# Patient Record
Sex: FEMALE | Race: WHITE | NOT HISPANIC OR LATINO | URBAN - METROPOLITAN AREA
[De-identification: names, ages, dates, MRNs, and addresses within clinical notes are randomized per-mention and may not be internally consistent; named-entity substitution may affect disease eponyms.]

---

## 2023-02-28 ENCOUNTER — INPATIENT HOSPITAL (OUTPATIENT)
Dept: URBAN - METROPOLITAN AREA HOSPITAL 50 | Facility: HOSPITAL | Age: 88
End: 2023-02-28
Payer: MEDICARE

## 2023-02-28 DIAGNOSIS — K57.30 DIVERTICULOSIS OF LARGE INTESTINE WITHOUT PERFORATION OR ABS: ICD-10-CM

## 2023-02-28 DIAGNOSIS — K59.00 CONSTIPATION, UNSPECIFIED: ICD-10-CM

## 2023-02-28 DIAGNOSIS — R13.10 DYSPHAGIA, UNSPECIFIED: ICD-10-CM

## 2023-02-28 DIAGNOSIS — K31.7 POLYP OF STOMACH AND DUODENUM: ICD-10-CM

## 2023-02-28 DIAGNOSIS — K31.89 OTHER DISEASES OF STOMACH AND DUODENUM: ICD-10-CM

## 2023-02-28 DIAGNOSIS — D62 ACUTE POSTHEMORRHAGIC ANEMIA: ICD-10-CM

## 2023-02-28 DIAGNOSIS — K92.1 MELENA: ICD-10-CM

## 2023-02-28 DIAGNOSIS — K44.9 DIAPHRAGMATIC HERNIA WITHOUT OBSTRUCTION OR GANGRENE: ICD-10-CM

## 2023-02-28 DIAGNOSIS — K31.819 ANGIODYSPLASIA OF STOMACH AND DUODENUM WITHOUT BLEEDING: ICD-10-CM

## 2023-02-28 DIAGNOSIS — K64.8 OTHER HEMORRHOIDS: ICD-10-CM

## 2023-02-28 PROCEDURE — 99222 1ST HOSP IP/OBS MODERATE 55: CPT | Performed by: INTERNAL MEDICINE

## 2023-03-01 PROCEDURE — 43270 EGD LESION ABLATION: CPT | Performed by: INTERNAL MEDICINE

## 2023-03-01 PROCEDURE — 43239 EGD BIOPSY SINGLE/MULTIPLE: CPT | Mod: XS | Performed by: INTERNAL MEDICINE

## 2023-03-02 PROCEDURE — 45378 DIAGNOSTIC COLONOSCOPY: CPT | Performed by: INTERNAL MEDICINE

## 2024-01-28 ENCOUNTER — INPATIENT HOSPITAL (OUTPATIENT)
Dept: URBAN - METROPOLITAN AREA HOSPITAL 50 | Facility: HOSPITAL | Age: 89
End: 2024-01-28
Payer: MEDICARE

## 2024-01-28 DIAGNOSIS — K21.9 GASTRO-ESOPHAGEAL REFLUX DISEASE WITHOUT ESOPHAGITIS: ICD-10-CM

## 2024-01-28 DIAGNOSIS — K44.9 DIAPHRAGMATIC HERNIA WITHOUT OBSTRUCTION OR GANGRENE: ICD-10-CM

## 2024-01-28 DIAGNOSIS — K57.90 DIVERTICULOSIS OF INTESTINE, PART UNSPECIFIED, WITHOUT PERFO: ICD-10-CM

## 2024-01-28 DIAGNOSIS — D62 ACUTE POSTHEMORRHAGIC ANEMIA: ICD-10-CM

## 2024-01-28 PROCEDURE — 99222 1ST HOSP IP/OBS MODERATE 55: CPT | Mod: FS | Performed by: NURSE PRACTITIONER

## 2024-01-29 ENCOUNTER — INPATIENT HOSPITAL (OUTPATIENT)
Dept: URBAN - METROPOLITAN AREA HOSPITAL 50 | Facility: HOSPITAL | Age: 89
End: 2024-01-29
Payer: MEDICARE

## 2024-01-29 DIAGNOSIS — K44.9 DIAPHRAGMATIC HERNIA WITHOUT OBSTRUCTION OR GANGRENE: ICD-10-CM

## 2024-01-29 DIAGNOSIS — K31.811 ANGIODYSPLASIA OF STOMACH AND DUODENUM WITH BLEEDING: ICD-10-CM

## 2024-01-29 DIAGNOSIS — K31.7 POLYP OF STOMACH AND DUODENUM: ICD-10-CM

## 2024-01-29 PROCEDURE — 43255 EGD CONTROL BLEEDING ANY: CPT | Performed by: INTERNAL MEDICINE

## 2024-01-30 ENCOUNTER — INPATIENT HOSPITAL (OUTPATIENT)
Dept: URBAN - METROPOLITAN AREA HOSPITAL 50 | Facility: HOSPITAL | Age: 89
End: 2024-01-30
Payer: MEDICARE

## 2024-01-30 DIAGNOSIS — K25.9 GASTRIC ULCER, UNSPECIFIED AS ACUTE OR CHRONIC, WITHOUT HEMO: ICD-10-CM

## 2024-01-30 DIAGNOSIS — K21.9 GASTRO-ESOPHAGEAL REFLUX DISEASE WITHOUT ESOPHAGITIS: ICD-10-CM

## 2024-01-30 DIAGNOSIS — K31.811 ANGIODYSPLASIA OF STOMACH AND DUODENUM WITH BLEEDING: ICD-10-CM

## 2024-01-30 DIAGNOSIS — K92.2 GASTROINTESTINAL HEMORRHAGE, UNSPECIFIED: ICD-10-CM

## 2024-01-30 DIAGNOSIS — D62 ACUTE POSTHEMORRHAGIC ANEMIA: ICD-10-CM

## 2024-01-30 DIAGNOSIS — K44.9 DIAPHRAGMATIC HERNIA WITHOUT OBSTRUCTION OR GANGRENE: ICD-10-CM

## 2024-01-30 DIAGNOSIS — K57.90 DIVERTICULOSIS OF INTESTINE, PART UNSPECIFIED, WITHOUT PERFO: ICD-10-CM

## 2024-01-30 PROCEDURE — 99233 SBSQ HOSP IP/OBS HIGH 50: CPT

## 2024-01-31 PROCEDURE — 99232 SBSQ HOSP IP/OBS MODERATE 35: CPT

## 2024-02-21 ENCOUNTER — OFFICE (OUTPATIENT)
Dept: URBAN - METROPOLITAN AREA CLINIC 27 | Facility: CLINIC | Age: 89
End: 2024-02-21
Payer: MEDICARE

## 2024-02-21 VITALS
WEIGHT: 145 LBS | DIASTOLIC BLOOD PRESSURE: 87 MMHG | TEMPERATURE: 98.3 F | SYSTOLIC BLOOD PRESSURE: 161 MMHG | HEART RATE: 81 BPM | HEIGHT: 60 IN

## 2024-02-21 DIAGNOSIS — R14.3 FLATULENCE: ICD-10-CM

## 2024-02-21 DIAGNOSIS — K21.9 GASTRO-ESOPHAGEAL REFLUX DISEASE WITHOUT ESOPHAGITIS: ICD-10-CM

## 2024-02-21 DIAGNOSIS — K25.3 ACUTE GASTRIC ULCER WITHOUT HEMORRHAGE OR PERFORATION: ICD-10-CM

## 2024-02-21 DIAGNOSIS — D62 ACUTE POSTHEMORRHAGIC ANEMIA: ICD-10-CM

## 2024-02-21 DIAGNOSIS — K31.811 ANGIODYSPLASIA OF STOMACH AND DUODENUM WITH BLEEDING: ICD-10-CM

## 2024-02-21 PROCEDURE — 99214 OFFICE O/P EST MOD 30 MIN: CPT

## 2024-02-21 RX ORDER — SUCRALFATE 1 G/1
1 TABLET ORAL
Qty: 90 | Refills: 3 | Status: ACTIVE
Start: 2024-02-21

## 2024-05-09 PROBLEM — E87.1 HYPONATREMIA: Status: ACTIVE | Noted: 2024-05-09

## 2024-05-09 PROBLEM — E78.2 MIXED HYPERLIPIDEMIA: Status: ACTIVE | Noted: 2024-05-09

## 2024-05-09 PROBLEM — I63.411 CEREBROVASCULAR ACCIDENT (CVA) DUE TO EMBOLISM OF RIGHT MIDDLE CEREBRAL ARTERY (MULTI): Status: ACTIVE | Noted: 2024-05-09

## 2024-05-09 PROBLEM — M79.7 FIBROMYALGIA: Status: ACTIVE | Noted: 2024-05-09

## 2024-05-09 PROBLEM — M48.062 LUMBAR STENOSIS WITH NEUROGENIC CLAUDICATION: Status: ACTIVE | Noted: 2024-05-09

## 2024-05-09 PROBLEM — F33.9 DEPRESSION, RECURRENT (CMS-HCC): Status: ACTIVE | Noted: 2024-05-09

## 2024-05-09 PROBLEM — M51.36 DEGENERATION OF INTERVERTEBRAL DISC OF LUMBAR REGION: Status: ACTIVE | Noted: 2024-05-09

## 2024-05-09 PROBLEM — K21.9 GASTROESOPHAGEAL REFLUX DISEASE: Status: ACTIVE | Noted: 2024-05-09

## 2024-05-09 PROBLEM — R13.10 DYSPHAGIA: Status: ACTIVE | Noted: 2024-05-09

## 2024-05-09 PROBLEM — Z85.3 HISTORY OF BREAST CANCER: Status: ACTIVE | Noted: 2024-05-09

## 2024-05-09 PROBLEM — I35.1 NONRHEUMATIC AORTIC VALVE INSUFFICIENCY: Status: ACTIVE | Noted: 2024-05-09

## 2024-05-09 PROBLEM — I25.10 2-VESSEL CORONARY ARTERY DISEASE: Status: ACTIVE | Noted: 2024-05-09

## 2024-05-09 PROBLEM — I10 ESSENTIAL HYPERTENSION: Status: ACTIVE | Noted: 2024-05-09

## 2024-05-09 PROBLEM — G47.01 INSOMNIA DUE TO MEDICAL CONDITION: Status: ACTIVE | Noted: 2024-05-09

## 2024-05-09 PROBLEM — M51.369 DEGENERATION OF INTERVERTEBRAL DISC OF LUMBAR REGION: Status: ACTIVE | Noted: 2024-05-09

## 2024-05-09 PROBLEM — I65.29 CAROTID STENOSIS: Status: ACTIVE | Noted: 2024-05-09

## 2024-05-09 PROBLEM — S42.309A HUMERUS FRACTURE: Status: ACTIVE | Noted: 2024-05-09

## 2024-05-09 PROBLEM — M96.1 POSTLAMINECTOMY SYNDROME OF LUMBOSACRAL REGION: Status: ACTIVE | Noted: 2024-05-09

## 2024-05-09 PROBLEM — N64.59 THICKENING OF SKIN OF BREAST: Status: ACTIVE | Noted: 2024-05-09

## 2024-05-09 PROBLEM — R53.83 FATIGUE: Status: ACTIVE | Noted: 2024-05-09

## 2024-05-09 PROBLEM — R60.0 LOWER LEG EDEMA: Status: ACTIVE | Noted: 2024-05-09

## 2024-05-09 PROBLEM — H90.3 SENSORINEURAL HEARING LOSS, BILATERAL: Status: ACTIVE | Noted: 2024-05-09

## 2024-05-24 ENCOUNTER — APPOINTMENT (OUTPATIENT)
Dept: CARDIOLOGY | Facility: CLINIC | Age: 89
End: 2024-05-24
Payer: MEDICARE

## 2024-06-12 ENCOUNTER — OFFICE VISIT (OUTPATIENT)
Dept: CARDIOLOGY | Facility: CLINIC | Age: 89
End: 2024-06-12
Payer: MEDICARE

## 2024-06-12 VITALS
HEIGHT: 55 IN | OXYGEN SATURATION: 90 % | WEIGHT: 141 LBS | DIASTOLIC BLOOD PRESSURE: 93 MMHG | BODY MASS INDEX: 32.63 KG/M2 | HEART RATE: 84 BPM | SYSTOLIC BLOOD PRESSURE: 164 MMHG

## 2024-06-12 DIAGNOSIS — I25.10 2-VESSEL CORONARY ARTERY DISEASE: Primary | ICD-10-CM

## 2024-06-12 DIAGNOSIS — I50.32 CHRONIC HEART FAILURE WITH PRESERVED EJECTION FRACTION (MULTI): ICD-10-CM

## 2024-06-12 DIAGNOSIS — I10 ESSENTIAL HYPERTENSION: ICD-10-CM

## 2024-06-12 PROCEDURE — 3080F DIAST BP >= 90 MM HG: CPT | Performed by: INTERNAL MEDICINE

## 2024-06-12 PROCEDURE — 1036F TOBACCO NON-USER: CPT | Performed by: INTERNAL MEDICINE

## 2024-06-12 PROCEDURE — 1126F AMNT PAIN NOTED NONE PRSNT: CPT | Performed by: INTERNAL MEDICINE

## 2024-06-12 PROCEDURE — 99214 OFFICE O/P EST MOD 30 MIN: CPT | Performed by: INTERNAL MEDICINE

## 2024-06-12 PROCEDURE — 3077F SYST BP >= 140 MM HG: CPT | Performed by: INTERNAL MEDICINE

## 2024-06-12 PROCEDURE — 1157F ADVNC CARE PLAN IN RCRD: CPT | Performed by: INTERNAL MEDICINE

## 2024-06-12 RX ORDER — PANTOPRAZOLE SODIUM 40 MG/1
40 TABLET, DELAYED RELEASE ORAL
COMMUNITY
Start: 2024-04-24

## 2024-06-12 RX ORDER — FUROSEMIDE 40 MG/1
40 TABLET ORAL DAILY
COMMUNITY
Start: 2024-05-03

## 2024-06-12 RX ORDER — FAMOTIDINE 20 MG/1
20 TABLET, FILM COATED ORAL DAILY
COMMUNITY
Start: 2024-04-24

## 2024-06-12 RX ORDER — TAMSULOSIN HYDROCHLORIDE 0.4 MG/1
0.4 CAPSULE ORAL DAILY
COMMUNITY
Start: 2024-04-24

## 2024-06-12 RX ORDER — MELOXICAM 7.5 MG/1
7.5 TABLET ORAL DAILY
COMMUNITY
Start: 2024-01-25

## 2024-06-12 RX ORDER — GABAPENTIN 100 MG/1
100 CAPSULE ORAL
COMMUNITY
Start: 2024-04-24

## 2024-06-12 RX ORDER — AMLODIPINE BESYLATE 5 MG/1
5 TABLET ORAL DAILY
COMMUNITY
Start: 2024-04-24

## 2024-06-12 RX ORDER — METOPROLOL SUCCINATE 50 MG/1
50 TABLET, EXTENDED RELEASE ORAL DAILY
COMMUNITY
Start: 2024-04-24

## 2024-06-12 RX ORDER — RAMIPRIL 10 MG/1
1 CAPSULE ORAL 2 TIMES DAILY
COMMUNITY
Start: 2014-12-31

## 2024-06-12 RX ORDER — MECLIZINE HCL 12.5 MG 12.5 MG/1
12.5 TABLET ORAL 3 TIMES DAILY PRN
COMMUNITY
Start: 2024-04-24

## 2024-06-12 RX ORDER — MIRABEGRON 50 MG/1
50 TABLET, FILM COATED, EXTENDED RELEASE ORAL DAILY
COMMUNITY
Start: 2024-04-25

## 2024-06-12 RX ORDER — SUCRALFATE 1 G/1
1 TABLET ORAL
COMMUNITY
Start: 2024-04-24

## 2024-06-12 RX ORDER — TRAMADOL HYDROCHLORIDE 50 MG/1
50 TABLET ORAL
COMMUNITY
Start: 2024-05-29

## 2024-06-12 RX ORDER — ATORVASTATIN CALCIUM 40 MG/1
40 TABLET, FILM COATED ORAL DAILY
COMMUNITY
Start: 2024-04-24

## 2024-06-12 RX ORDER — GABAPENTIN 300 MG/1
300 CAPSULE ORAL
COMMUNITY
Start: 2024-01-25

## 2024-06-12 RX ORDER — OXYBUTYNIN CHLORIDE 5 MG/1
5 TABLET, EXTENDED RELEASE ORAL DAILY
COMMUNITY
Start: 2024-04-24

## 2024-06-12 RX ORDER — EPINEPHRINE 0.22MG
1 AEROSOL WITH ADAPTER (ML) INHALATION 2 TIMES DAILY
COMMUNITY
Start: 2013-05-24

## 2024-06-12 RX ORDER — LOSARTAN POTASSIUM 25 MG/1
25 TABLET ORAL DAILY
COMMUNITY
Start: 2024-04-24

## 2024-06-12 RX ORDER — FUROSEMIDE 20 MG/1
20 TABLET ORAL DAILY
COMMUNITY
Start: 2024-04-24

## 2024-06-12 ASSESSMENT — PAIN SCALES - GENERAL: PAINLEVEL: 0-NO PAIN

## 2024-06-12 NOTE — PATIENT INSTRUCTIONS
Follow up with Dr. Ramicone in 6-8 months.    Follow up with Leonie Rodriguez in 1-2 months at [facility].

## 2024-06-12 NOTE — PROGRESS NOTES
"History Of Present Illness:      This is a 89-year-old female with coronary artery disease and hypertension.  She resides in an assisted living facility.  The patient has had an increase in lower extremity edema and was placed on furosemide, initially 40 mg daily for 5 days, then down to 20 mg daily.  She is having some dyspnea on exertion.  Her daughters were present during today's evaluation.  The patient was evaluated for pneumonia but this was ruled out.    Review of Systems  Other review of systems negative     Last Recorded Vitals:      8/8/2022     8:11 AM 8/8/2022    11:35 AM 8/10/2022    10:33 AM 8/17/2022    11:52 AM 3/15/2023     1:10 PM 8/25/2023    11:18 AM 6/12/2024     2:35 PM   Vitals   Systolic 188 161 146 188 147 151 164   Diastolic 98 82 70 95 79 90 93   Heart Rate 73 68 79 72 77 70 84   Temp  36.3 °C (97.3 °F)  36.8 °C (98.2 °F)      Resp 20 18  20      Height (in) 1.372 m (4' 6\") 1.391 m (4' 6.76\") 1.372 m (4' 6\") 1.372 m (4' 6\") 1.372 m (4' 6\") 1.372 m (4' 6\") 1.372 m (4' 6\")   Weight (lb) 128 123.68 124 124  137.6 141   BMI 30.86 kg/m2 28.99 kg/m2 29.9 kg/m2 29.9 kg/m2 29.9 kg/m2 33.18 kg/m2 34 kg/m2   BSA (m2) 1.49 m2 1.47 m2 1.46 m2 1.46 m2 1.46 m2 1.54 m2 1.56 m2   Visit Report       Report          Allergies:  Propoxyphene n-acetaminophen and Atorvastatin    Outpatient Medications:  Current Outpatient Medications   Medication Instructions    amLODIPine (NORVASC) 5 mg, oral, Daily    aspirin-calcium carbonate 81 mg-300 mg calcium(777 mg) tablet 1 tablet, oral, Daily    atorvastatin (LIPITOR) 40 mg, oral, Daily    coenzyme Q-10 100 mg capsule 1 capsule, oral, 2 times daily    famotidine (PEPCID) 20 mg, oral, Daily    furosemide (LASIX) 20 mg, oral, Daily    furosemide (LASIX) 40 mg, oral, Daily    gabapentin (NEURONTIN) 100 mg, oral    gabapentin (NEURONTIN) 300 mg, oral    losartan (COZAAR) 25 mg, oral, Daily    meclizine (ANTIVERT) 12.5 mg, oral, 3 times daily PRN    meloxicam (MOBIC) 7.5 " mg, oral, Daily    metoprolol succinate XL (TOPROL-XL) 50 mg, oral, Daily    Myrbetriq 50 mg, oral, Daily    oxybutynin XL (DITROPAN-XL) 5 mg, oral, Daily    pantoprazole (PROTONIX) 40 mg, oral, Daily before breakfast    ramipril (Altace) 10 mg capsule 1 capsule, oral, 2 times daily    sucralfate (CARAFATE) 1 g, oral    tamsulosin (FLOMAX) 0.4 mg, oral, Daily    traMADol (ULTRAM) 50 mg       Physical Exam:    General Appearance:  Alert, oriented, no distress  Skin:  Warm and dry  Head and Neck:  No elevation of JVP, no carotid bruits  Cardiac Exam:  Rhythm is regular, S1 and S2 are normal, no murmur S3 or S4  Lungs:  Clear to auscultation  Extremities:  1-2+ edema  Neurologic:  No focal deficits  Psychiatric:  Appropriate mood and behavior         Cardiology Tests:  I have personally review the diagnostic cardiac testing and my interpretation is as follows:    Echocardiogram April 2023: Normal left ventricular function with mild to moderate mitral valve regurgitation  Myocardial perfusion stress test November 2021: No ischemia, normal left ventricular function      Assessment/Plan   Problem List Items Addressed This Visit             ICD-10-CM    2-vessel coronary artery disease - Primary I25.10    Relevant Medications    amLODIPine (Norvasc) 5 mg tablet    metoprolol succinate XL (Toprol-XL) 50 mg 24 hr tablet    Essential hypertension I10    Chronic heart failure with preserved ejection fraction (Multi) I50.32     1.  HFpEF: The patient has evidence of diastolic congestive heart failure.  I recommended that the furosemide be increased to 40 mg daily.  Her weight should be monitored on a daily basis at the assisted living facility and then if her weight drops by 5 to 10 pounds, the furosemide can be decreased back to 20 mg daily and adjusted upward as needed for weight gain of 2 to 3 pounds.  If she continues to have difficulties, we could consider the addition of an SGLT2 inhibitor.    2.  Coronary artery disease:  History of CAD with CABG surgery in 2007.  The most recent myocardial perfusion imaging from November 2021 showed no ischemia and normal left ventricular function.  I would not recommend further ischemic evaluations given her advanced age and frail condition.  Medical management is recommended.         Relevant Medications    amLODIPine (Norvasc) 5 mg tablet    metoprolol succinate XL (Toprol-XL) 50 mg 24 hr tablet       James C Ramicone, DO

## 2024-06-12 NOTE — ASSESSMENT & PLAN NOTE
1.  HFpEF: The patient has evidence of diastolic congestive heart failure.  I recommended that the furosemide be increased to 40 mg daily.  Her weight should be monitored on a daily basis at the assisted living facility and then if her weight drops by 5 to 10 pounds, the furosemide can be decreased back to 20 mg daily and adjusted upward as needed for weight gain of 2 to 3 pounds.  If she continues to have difficulties, we could consider the addition of an SGLT2 inhibitor.    2.  Coronary artery disease: History of CAD with CABG surgery in 2007.  The most recent myocardial perfusion imaging from November 2021 showed no ischemia and normal left ventricular function.  I would not recommend further ischemic evaluations given her advanced age and frail condition.  Medical management is recommended.

## 2024-06-27 PROBLEM — M19.90 ARTHRITIS: Status: ACTIVE | Noted: 2024-06-27

## 2024-06-27 PROBLEM — D50.9 IRON DEFICIENCY ANEMIA: Status: ACTIVE | Noted: 2024-06-27

## 2024-06-27 PROBLEM — Z96.611 S/P REVERSE TOTAL SHOULDER ARTHROPLASTY, RIGHT: Status: ACTIVE | Noted: 2021-11-29

## 2024-06-27 PROBLEM — I10 HYPERTENSION: Status: ACTIVE | Noted: 2021-04-14

## 2024-06-27 PROBLEM — I25.10 CORONARY ATHEROSCLEROSIS: Status: ACTIVE | Noted: 2021-04-14

## 2024-06-27 PROBLEM — Q24.5 MALFORMATION OF CORONARY VESSELS (HHS-HCC): Status: ACTIVE | Noted: 2024-06-27

## 2024-06-27 PROBLEM — C50.919 MALIGNANT NEOPLASM OF UNSPECIFIED SITE OF UNSPECIFIED FEMALE BREAST (MULTI): Status: ACTIVE | Noted: 2024-06-27

## 2024-06-27 PROBLEM — L94.0: Status: ACTIVE | Noted: 2024-06-27

## 2024-07-18 ENCOUNTER — OFFICE VISIT (OUTPATIENT)
Dept: CARDIOLOGY | Facility: CLINIC | Age: 89
End: 2024-07-18
Payer: MEDICARE

## 2024-07-18 VITALS
SYSTOLIC BLOOD PRESSURE: 121 MMHG | BODY MASS INDEX: 32.63 KG/M2 | HEART RATE: 73 BPM | WEIGHT: 141 LBS | DIASTOLIC BLOOD PRESSURE: 72 MMHG | OXYGEN SATURATION: 91 % | HEIGHT: 55 IN

## 2024-07-18 DIAGNOSIS — I50.32 CHRONIC HEART FAILURE WITH PRESERVED EJECTION FRACTION (MULTI): Primary | ICD-10-CM

## 2024-07-18 DIAGNOSIS — R60.0 LOWER EXTREMITY EDEMA: ICD-10-CM

## 2024-07-18 PROCEDURE — 99213 OFFICE O/P EST LOW 20 MIN: CPT | Performed by: NURSE PRACTITIONER

## 2024-07-18 PROCEDURE — 3078F DIAST BP <80 MM HG: CPT | Performed by: NURSE PRACTITIONER

## 2024-07-18 PROCEDURE — 1157F ADVNC CARE PLAN IN RCRD: CPT | Performed by: NURSE PRACTITIONER

## 2024-07-18 PROCEDURE — 1159F MED LIST DOCD IN RCRD: CPT | Performed by: NURSE PRACTITIONER

## 2024-07-18 PROCEDURE — 3074F SYST BP LT 130 MM HG: CPT | Performed by: NURSE PRACTITIONER

## 2024-07-18 PROCEDURE — 1036F TOBACCO NON-USER: CPT | Performed by: NURSE PRACTITIONER

## 2024-07-18 NOTE — PROGRESS NOTES
Anne Alberts is a 89 y.o. female     History Of Present Illness   Ms Alberts is a 89 year old here for complaints of lower extremity edema.  She reports that since she increased her lasix to 40mg, her edema is almost completely resolved, however her weight is unchanged.  She is also having episodes of desaturation when she is ambulating in physical therapy.  She admits she is not as active as she use to be and often sits in a recliner.  She now resides in long term care facility.    PMH  Acute blood loss anemia   GI bleed   Metabolic encephalopathy   Community acquired pneumonia   Hypertension,    hyperlipidemia,    Coronary artery disease,   GERD,    breast cancer s/p lumpectomy,    Spinal stenosis,    Recent SDH    Hyponatremia       Social HX  Social History     Tobacco Use    Smoking status: Never    Smokeless tobacco: Never          Family HX  No family history on file.       Review Of Systems   Mild ankle edema  Shortness of breath and desat during exertion  No weight gain or weight loss  Denies orthopnea  Denies chest pain  Denies leg pain      Allergies  Allergies   Allergen Reactions    Propoxyphene N-Acetaminophen Other    Atorvastatin Unknown          Vitals  There were no vitals taken for this visit.        Physical Exam  +triphasic bilateral DP and PT signals  Mild lower extremity edema  Pulse ox 88-92% in the office      Current/Home Meds    Current Outpatient Medications:     amLODIPine (Norvasc) 5 mg tablet, Take 1 tablet (5 mg) by mouth once daily., Disp: , Rfl:     aspirin-calcium carbonate 81 mg-300 mg calcium(777 mg) tablet, Take 1 tablet by mouth once daily., Disp: , Rfl:     atorvastatin (Lipitor) 40 mg tablet, Take 1 tablet (40 mg) by mouth once daily., Disp: , Rfl:     coenzyme Q-10 100 mg capsule, Take 1 capsule (100 mg) by mouth 2 times a day., Disp: , Rfl:     famotidine (Pepcid) 20 mg tablet, Take 1 tablet (20 mg) by mouth once daily., Disp: , Rfl:     furosemide (Lasix) 20 mg  tablet, Take 1 tablet (20 mg) by mouth once daily., Disp: , Rfl:     furosemide (Lasix) 40 mg tablet, Take 1 tablet (40 mg) by mouth once daily., Disp: , Rfl:     gabapentin (Neurontin) 100 mg capsule, Take 1 capsule (100 mg) by mouth., Disp: , Rfl:     gabapentin (Neurontin) 300 mg capsule, Take 1 capsule (300 mg) by mouth., Disp: , Rfl:     losartan (Cozaar) 25 mg tablet, Take 1 tablet (25 mg) by mouth once daily., Disp: , Rfl:     meclizine (Antivert) 12.5 mg tablet, Take 1 tablet (12.5 mg) by mouth 3 times a day as needed., Disp: , Rfl:     meloxicam (Mobic) 7.5 mg tablet, Take 1 tablet (7.5 mg) by mouth once daily., Disp: , Rfl:     metoprolol succinate XL (Toprol-XL) 50 mg 24 hr tablet, Take 1 tablet (50 mg) by mouth once daily., Disp: , Rfl:     Myrbetriq 50 mg tablet extended release 24 hr 24 hr tablet, Take 1 tablet (50 mg) by mouth once daily., Disp: , Rfl:     oxybutynin XL (Ditropan-XL) 5 mg 24 hr tablet, Take 1 tablet (5 mg) by mouth once daily., Disp: , Rfl:     pantoprazole (ProtoNix) 40 mg EC tablet, Take 1 tablet (40 mg) by mouth once daily in the morning. Take before meals., Disp: , Rfl:     ramipril (Altace) 10 mg capsule, Take 1 capsule (10 mg) by mouth 2 times a day., Disp: , Rfl:     sucralfate (Carafate) 1 gram tablet, Take 1 tablet (1 g) by mouth., Disp: , Rfl:     tamsulosin (Flomax) 0.4 mg 24 hr capsule, Take 1 capsule (0.4 mg) by mouth once daily., Disp: , Rfl:     traMADol (Ultram) 50 mg tablet, 1 tablet (50 mg)., Disp: , Rfl:            Cardiac Service Results:  No results found for this or any previous visit from the past 365 days.        Assessment/Plan      Lower extremity edema:  Much improved with lasix 40mg.  She is to continue lasix 40mg until her edema is resolved.  Her weight has not changed.  She is to wear compression stockings from am to HS.  She is to elevate her legs above her heart 2-3 times a day above her heart  I have instructed her on chair exercises that I would like  her to do three times a day.  Patient should be evaluated for O2 while exerting herself.  Follow up with Dr Ramicone as directed

## 2024-07-19 NOTE — PROGRESS NOTES
Order addended, nursing facility that pt resides at needs order to be more specific. Discussed with Elidia

## 2024-08-16 ENCOUNTER — APPOINTMENT (OUTPATIENT)
Dept: CARDIOLOGY | Facility: CLINIC | Age: 89
End: 2024-08-16

## 2024-09-20 ENCOUNTER — TELEPHONE (OUTPATIENT)
Dept: CARDIOLOGY | Facility: CLINIC | Age: 89
End: 2024-09-20
Payer: MEDICARE

## 2024-09-24 NOTE — TELEPHONE ENCOUNTER
Pt is scheduled for cysto, pyelogram, bladder bx with fulguration at [facility] on 9/27/24.    Clearance is requested.     Discussed with Dr Ramicone, notified him that she was recently hospitalized at [facility]--had LHC that did not require intervention and echo. He will clear pt for surgery. I notified dtr. I faxed request.

## 2024-11-01 ENCOUNTER — TELEPHONE (OUTPATIENT)
Dept: CARDIOLOGY | Facility: CLINIC | Age: 89
End: 2024-11-01
Payer: COMMERCIAL

## 2024-11-01 NOTE — TELEPHONE ENCOUNTER
Daughter states she was recently at [facility] and taken off her meds and she has been acting funny Ramicone has nothing sooner  Avis first available is not until next month    Last saw DD 7-18  Zhang saw JCR 6-12

## 2024-11-06 ENCOUNTER — OFFICE VISIT (OUTPATIENT)
Dept: CARDIOLOGY | Facility: CLINIC | Age: 89
End: 2024-11-06
Payer: COMMERCIAL

## 2024-11-06 VITALS
OXYGEN SATURATION: 94 % | BODY MASS INDEX: 30.72 KG/M2 | SYSTOLIC BLOOD PRESSURE: 128 MMHG | DIASTOLIC BLOOD PRESSURE: 88 MMHG | HEART RATE: 79 BPM | WEIGHT: 127.4 LBS

## 2024-11-06 DIAGNOSIS — I50.32 CHRONIC HEART FAILURE WITH PRESERVED EJECTION FRACTION: Primary | ICD-10-CM

## 2024-11-06 PROBLEM — M54.50 LOW BACK PAIN: Status: ACTIVE | Noted: 2024-11-06

## 2024-11-06 PROBLEM — R39.9 SYMPTOMS INVOLVING URINARY SYSTEM: Status: ACTIVE | Noted: 2024-11-06

## 2024-11-06 PROCEDURE — 3079F DIAST BP 80-89 MM HG: CPT | Performed by: PHYSICIAN ASSISTANT

## 2024-11-06 PROCEDURE — 99214 OFFICE O/P EST MOD 30 MIN: CPT | Performed by: PHYSICIAN ASSISTANT

## 2024-11-06 PROCEDURE — 1160F RVW MEDS BY RX/DR IN RCRD: CPT | Performed by: PHYSICIAN ASSISTANT

## 2024-11-06 PROCEDURE — 1159F MED LIST DOCD IN RCRD: CPT | Performed by: PHYSICIAN ASSISTANT

## 2024-11-06 PROCEDURE — 1157F ADVNC CARE PLAN IN RCRD: CPT | Performed by: PHYSICIAN ASSISTANT

## 2024-11-06 PROCEDURE — 3074F SYST BP LT 130 MM HG: CPT | Performed by: PHYSICIAN ASSISTANT

## 2024-11-06 NOTE — PROGRESS NOTES
Chief Complaint:   New Patient Visit (Hospital follow up ), HFpEF, recent med changes     History Of Present Illness:    Anne Alberts is a 89 y.o. female presenting after recent hospitalization due to concerns for underlying ischemic CAD.  Acute ischemic workup at [facility] was essentially unremarkable, during admission ARB therapy and furosemide were DC'D for unknown reasons.  Since that time patient has been more dyspneic with malaise/fatigue - recent urine culture was positive for likely UTI.  Patient denies chest pain, chest pressure, palpitations, shortness of breath at rest, diaphoresis, nausea/vomiting, back pain, headache, lightheadedness, dizziness, syncope or presyncopal episodes, active bleeding signs or symptoms, excessive weight gain, muscle or joint pain, claudication.       Last Recorded Vitals:  Vitals:    11/06/24 1405   BP: 128/88   BP Location: Left arm   Patient Position: Sitting   BP Cuff Size: Adult   Pulse: 79   SpO2: 94%   Weight: 57.8 kg (127 lb 6.4 oz)       Past Medical History:  She has a past medical history of Epistaxis (05/04/2016), Gastro-esophageal reflux disease without esophagitis (09/17/2020), Laceration without foreign body of left forearm, initial encounter (06/11/2015), Other conditions influencing health status, Other intervertebral disc degeneration, lumbar region (07/13/2015), Pelvic and perineal pain (10/12/2015), Personal history of diseases of the blood and blood-forming organs and certain disorders involving the immune mechanism, Personal history of other diseases of the circulatory system (09/15/2015), Personal history of other diseases of the digestive system (09/15/2015), Personal history of other endocrine, nutritional and metabolic disease (09/15/2015), Personal history of other specified conditions (05/03/2016), Personal history of transient ischemic attack (TIA), and cerebral infarction without residual deficits, Pure hypercholesterolemia, unspecified, and  Thyrotoxicosis, unspecified without thyrotoxic crisis or storm (04/14/2015).    Past Surgical History:  She has a past surgical history that includes Other surgical history (05/24/2013); Knee arthroscopy w/ debridement (05/24/2013); Other surgical history (05/24/2013); Cervical fusion (05/24/2013); Coronary artery bypass graft (05/24/2013); Appendectomy (05/24/2013); Back surgery (07/13/2015); Total shoulder arthroplasty (12/28/2016); Other surgical history (06/11/2021); Tonsillectomy (05/04/2016); Other surgical history (02/07/2014); Shoulder surgery (01/20/2014); Total knee arthroplasty (01/20/2014); and Colonoscopy (06/03/2014).      Social History:  She reports that she has never smoked. She has never used smokeless tobacco. No history on file for alcohol use and drug use.    Family History:  No family history on file.     Allergies:  Propoxyphene n-acetaminophen and Atorvastatin    Outpatient Medications:  Current Outpatient Medications   Medication Instructions    amLODIPine (NORVASC) 5 mg, Daily    aspirin-calcium carbonate 81 mg-300 mg calcium(777 mg) tablet 1 tablet, Daily    atorvastatin (LIPITOR) 40 mg, Daily    coenzyme Q-10 100 mg capsule 1 capsule, 2 times daily    famotidine (PEPCID) 20 mg, Daily    gabapentin (NEURONTIN) 300 mg    meclizine (ANTIVERT) 12.5 mg, 3 times daily PRN    meloxicam (MOBIC) 7.5 mg, Daily    metoprolol succinate XL (TOPROL-XL) 50 mg, Daily    Myrbetriq 50 mg, Daily    oxybutynin XL (DITROPAN-XL) 5 mg, Daily    pantoprazole (PROTONIX) 40 mg, Daily before breakfast    ramipril (Altace) 10 mg capsule 1 capsule, 2 times daily    sucralfate (CARAFATE) 1 g    tamsulosin (FLOMAX) 0.4 mg, Daily    traMADol (ULTRAM) 50 mg       Physical Exam:  Constitutional: awake and alert, oriented ×3, no apparent distress  Skin: warm, dry, good turgor no obvious lesions  Eyes: pupils equal, round, reactive to light, conjunctiva pink and noninjected, no discharge  HENT: normocephalic and atraumatic,  "mucous membranes moist, trachea midline with no masses/goiter  Cardiovascular: S1/S2 regular, no murmur no rubs/gallops, no carotid bruits, no JVD  Pulmonary:  +crackles at bilateral lung bases  Abdomen: nontender, nondistended, active bowel sounds, no ascites  Extremities: no cyanosis, clubbing, no LE edema no lesions; palpable pedal pulses  Neurologic: cranial nerves II - XII grossly intact, stable gait, no tremor       Last Labs:  CBC -  Lab Results   Component Value Date    WBC 7.1 08/20/2021    HGB 13.2 08/20/2021    HCT 40.9 08/20/2021     08/20/2021     08/20/2021       CMP -  Lab Results   Component Value Date    CALCIUM 9.9 08/20/2021    PROT 7.3 08/20/2021    ALBUMIN 4.5 08/20/2021    AST 25 08/20/2021    ALT 16 08/20/2021    ALKPHOS 76 08/20/2021    BILITOT 0.6 08/20/2021       LIPID PANEL -   Lab Results   Component Value Date    CHOL 139 08/20/2021    TRIG 209 (H) 08/20/2021    HDL 39.8 (A) 08/20/2021    CHHDL 3.5 08/20/2021    LDLF 57 08/20/2021    VLDL 42 (H) 08/20/2021    NHDL 99 08/20/2021       RENAL FUNCTION PANEL -   Lab Results   Component Value Date    GLUCOSE 103 (H) 08/20/2021     (L) 08/20/2021    K 4.6 08/20/2021    CL 96 (L) 08/20/2021    CO2 27 08/20/2021    ANIONGAP 15 08/20/2021    BUN 18 08/20/2021    CREATININE 0.89 08/20/2021    CALCIUM 9.9 08/20/2021    ALBUMIN 4.5 08/20/2021        No results found for: \"BNP\", \"HGBA1C\"    Last Cardiology Tests:  ECG:  No results found for this or any previous visit from the past 1095 days.      Echo:  No results found for this or any previous visit from the past 1095 days.      Ejection Fractions:  No results found for: \"EF\"    Cath:  No results found for this or any previous visit from the past 1095 days.      Stress Test:  No results found for this or any previous visit from the past 1095 days.      Cardiac Imaging:  No results found for this or any previous visit from the past 1095 days.      Assessment/Plan   Problem List " Items Addressed This Visit             ICD-10-CM       Cardiac and Vasculature    Chronic heart failure with preserved ejection fraction - Primary I50.32    Relevant Orders    Transthoracic echo (TTE) complete       -We will arrange for a 2D echocardiogram to assess LVEF and valvular function.    -Resume furosemide 20mg daily    -Continue all other current GDMT as prescribed    -F/U Dr. Ramicone in 2 months as previously scheduled      RADHA MansfieldC

## 2024-11-14 ENCOUNTER — APPOINTMENT (OUTPATIENT)
Dept: CARDIOLOGY | Facility: CLINIC | Age: 89
End: 2024-11-14
Payer: COMMERCIAL

## 2024-11-20 ENCOUNTER — HOSPITAL ENCOUNTER (OUTPATIENT)
Dept: CARDIOLOGY | Facility: CLINIC | Age: 89
Discharge: HOME | End: 2024-11-20
Payer: COMMERCIAL

## 2024-11-20 DIAGNOSIS — I50.32 CHRONIC HEART FAILURE WITH PRESERVED EJECTION FRACTION: ICD-10-CM

## 2024-11-20 PROCEDURE — 93306 TTE W/DOPPLER COMPLETE: CPT | Performed by: INTERNAL MEDICINE

## 2024-11-20 PROCEDURE — 76376 3D RENDER W/INTRP POSTPROCES: CPT

## 2024-11-22 LAB
AORTIC VALVE MEAN GRADIENT: 3 MMHG
AORTIC VALVE PEAK VELOCITY: 1.26 M/S
AV PEAK GRADIENT: 6 MMHG
EJECTION FRACTION APICAL 4 CHAMBER: 67.9
EJECTION FRACTION: 63 %
LEFT VENTRICLE INTERNAL DIMENSION DIASTOLE: 4.57 CM (ref 3.5–6)
MITRAL VALVE E/A RATIO: 0.72
RIGHT VENTRICLE FREE WALL PEAK S': 8 CM/S
RIGHT VENTRICLE PEAK SYSTOLIC PRESSURE: 30.9 MMHG
TRICUSPID ANNULAR PLANE SYSTOLIC EXCURSION: 1.8 CM

## 2024-12-23 PROBLEM — H35.3110 NONEXUDATIVE AGE-RELATED MACULAR DEGENERATION OF RIGHT EYE: Status: ACTIVE | Noted: 2024-11-18

## 2025-01-08 ENCOUNTER — OFFICE VISIT (OUTPATIENT)
Dept: CARDIOLOGY | Facility: CLINIC | Age: OVER 89
End: 2025-01-08
Payer: COMMERCIAL

## 2025-01-08 VITALS
HEIGHT: 55 IN | HEART RATE: 90 BPM | DIASTOLIC BLOOD PRESSURE: 98 MMHG | BODY MASS INDEX: 31.7 KG/M2 | SYSTOLIC BLOOD PRESSURE: 155 MMHG | WEIGHT: 137 LBS

## 2025-01-08 DIAGNOSIS — I50.32 CHRONIC HEART FAILURE WITH PRESERVED EJECTION FRACTION: Primary | ICD-10-CM

## 2025-01-08 PROCEDURE — 1159F MED LIST DOCD IN RCRD: CPT | Performed by: INTERNAL MEDICINE

## 2025-01-08 PROCEDURE — 1157F ADVNC CARE PLAN IN RCRD: CPT | Performed by: INTERNAL MEDICINE

## 2025-01-08 PROCEDURE — 99213 OFFICE O/P EST LOW 20 MIN: CPT | Performed by: INTERNAL MEDICINE

## 2025-01-08 PROCEDURE — 3080F DIAST BP >= 90 MM HG: CPT | Performed by: INTERNAL MEDICINE

## 2025-01-08 PROCEDURE — 1036F TOBACCO NON-USER: CPT | Performed by: INTERNAL MEDICINE

## 2025-01-08 PROCEDURE — 3077F SYST BP >= 140 MM HG: CPT | Performed by: INTERNAL MEDICINE

## 2025-01-08 RX ORDER — FUROSEMIDE 20 MG/1
20 TABLET ORAL DAILY
COMMUNITY
Start: 2024-11-20 | End: 2025-01-08 | Stop reason: SDUPTHER

## 2025-01-08 RX ORDER — GRANULES FOR ORAL 3 G/1
3 POWDER ORAL
COMMUNITY
Start: 2024-11-22

## 2025-01-08 RX ORDER — FUROSEMIDE 20 MG/1
20 TABLET ORAL DAILY
Qty: 90 TABLET | Refills: 3 | Status: SHIPPED | OUTPATIENT
Start: 2025-01-08 | End: 2026-01-08

## 2025-01-08 NOTE — PROGRESS NOTES
"CARDIAC ELECTROPHYSIOLOGY PROGRESS NOTE    History Of Present Illness:      This is a 89-year-old female with coronary artery disease and hypertension. She resides in an assisted living facility. The patient was taken to [facility], then recently life flighted to [facility] because of concerns over a STEMI.  She did undergo a cardiac catheterization and had no obstructive CAD, and bypass grafts were patent.  She was treated for diastolic CHF.  [facility] records reviewed.    Patient has a history of CAD with prior CABG surgery in 2007  HFpEF  Hypertension  Hyperlipidemia  History of GI bleeding secondary to AVMs  History of UTIs    Review of Systems  Other review of systems negative     Last Recorded Vitals:      8/10/2022    10:33 AM 8/17/2022    11:52 AM 3/15/2023     1:10 PM 8/25/2023    11:18 AM 6/12/2024     2:35 PM 7/18/2024     9:54 AM 11/6/2024     2:05 PM   Vitals   Systolic 146 188 147 151 164 121 128   Diastolic 70 95 79 90 93 72 88   BP Location     Left arm Left arm Left arm   Heart Rate 79 72 77 70 84 73 79   Temp  36.8 °C (98.2 °F)        Resp  20        Height 1.372 m (4' 6\") 1.372 m (4' 6\") 1.372 m (4' 6\") 1.372 m (4' 6\") 1.372 m (4' 6\") 1.372 m (4' 6\")    Weight (lb) 124 124  137.6 141 141 127.4   BMI 29.9 kg/m2 29.9 kg/m2 29.9 kg/m2 33.18 kg/m2 34 kg/m2 34 kg/m2 30.72 kg/m2   BSA (m2) 1.46 m2 1.46 m2 1.46 m2 1.54 m2 1.56 m2 1.56 m2 1.48 m2   Visit Report     Report Report Report     Allergies:  Propoxyphene n-acetaminophen and Atorvastatin    Outpatient Medications:  Current Outpatient Medications   Medication Instructions    amLODIPine (NORVASC) 5 mg, Daily    aspirin-calcium carbonate 81 mg-300 mg calcium(777 mg) tablet 1 tablet, Daily    atorvastatin (LIPITOR) 40 mg, Daily    coenzyme Q-10 100 mg capsule 1 capsule, 2 times daily    famotidine (PEPCID) 20 mg, Daily    gabapentin (NEURONTIN) 300 mg    meclizine (ANTIVERT) 12.5 mg, 3 times daily PRN    meloxicam (MOBIC) 7.5 mg, Daily    " "metoprolol succinate XL (TOPROL-XL) 50 mg, Daily    Myrbetriq 50 mg, Daily    oxybutynin XL (DITROPAN-XL) 5 mg, Daily    pantoprazole (PROTONIX) 40 mg, Daily before breakfast    ramipril (Altace) 10 mg capsule 1 capsule, 2 times daily    sucralfate (CARAFATE) 1 g    tamsulosin (FLOMAX) 0.4 mg, Daily    traMADol (ULTRAM) 50 mg       Physical Exam:    General Appearance:  Alert, oriented, no distress  Skin:  Warm and dry  Head and Neck:  No elevation of JVP, no carotid bruits  Cardiac Exam:  Rhythm is regular, S1 and S2 are normal, no murmur S3 or S4  Lungs: Basilar rales  Extremities:  no edema  Neurologic:  No focal deficits  Psychiatric:  Appropriate mood and behavior    Lab Results:    CMP:  Recent Labs     08/20/21  0933 02/12/21  0941 06/03/20  0921 03/21/19  1005 09/13/18  0939   * 139 139 139 135*   K 4.6 4.7 4.5 4.4 4.4   CL 96* 101 100 102 101   CO2 27 30 29 27 27   ANIONGAP 15 13 15 14 11   BUN 18 21 17 19 16   CREATININE 0.89 0.91 0.85 0.88 0.83     Recent Labs     08/20/21 0933 02/12/21  0941 06/03/20  0921 03/21/19  1005 09/13/18  0939   ALBUMIN 4.5  --  4.3  --  4.4   ALKPHOS 76  --  65  --  78   ALT 16 13 15 16 15   AST 25  --  26  --  26   BILITOT 0.6  --  0.7  --  0.7     CBC:  Recent Labs     08/20/21  0933 02/12/21  0941 06/03/20  0921 09/13/18  0939   WBC 7.1 5.3 5.4 5.3   HGB 13.2 12.6 12.6 12.8   HCT 40.9 40.0 39.3 38.5    259 241 262    99 98 98     COAG: No results for input(s): \"PTT\", \"INR\", \"HAUF\", \"DDIMERVTE\", \"HAPTOGLOBIN\", \"FIBRINOGEN\" in the last 39021 hours.  Cardiology Tests:  I have personally review the diagnostic cardiac testing and my interpretation is as follows:    Echocardiogram April 2023: Normal left ventricular function with mild to moderate mitral valve regurgitation.    Myocardial perfusion stress test November 2021: No ischemia, normal left ventricular function.    Cardiac catheterization August 27, 2024: LIMA to LAD patent SVG to diagonal patent SVG " to first diagonal patent    Assessment/Plan   Problem List Items Addressed This Visit             ICD-10-CM    Chronic heart failure with preserved ejection fraction - Primary I50.32     Anne was hospitalized at [facility] because of concerns over a STEMI.  However cardiac catheterization showed patent bypass grafts and no PCI was required.  She was treated for mild diastolic CHF and is back on furosemide 20 mg daily.  We will continue with furosemide 20 mg daily and adjust as needed based on her weight.  Her weight is down 4 pounds compared to the last office visit.  Continue current medication regimen.         Relevant Medications    furosemide (Lasix) 20 mg tablet       James C Ramicone, DO

## 2025-01-09 NOTE — ASSESSMENT & PLAN NOTE
Anne was hospitalized at [facility] because of concerns over a STEMI.  However cardiac catheterization showed patent bypass grafts and no PCI was required.  She was treated for mild diastolic CHF and is back on furosemide 20 mg daily.  We will continue with furosemide 20 mg daily and adjust as needed based on her weight.  Her weight is down 4 pounds compared to the last office visit.  Continue current medication regimen.

## 2025-03-20 ENCOUNTER — HOSPITAL ENCOUNTER (OUTPATIENT)
Dept: RADIOLOGY | Facility: HOSPITAL | Age: OVER 89
Discharge: HOME | End: 2025-03-20
Payer: COMMERCIAL

## 2025-03-20 ENCOUNTER — OFFICE VISIT (OUTPATIENT)
Dept: CARDIOLOGY | Facility: CLINIC | Age: OVER 89
End: 2025-03-20
Payer: COMMERCIAL

## 2025-03-20 VITALS
OXYGEN SATURATION: 82 % | WEIGHT: 138 LBS | HEART RATE: 106 BPM | DIASTOLIC BLOOD PRESSURE: 86 MMHG | HEIGHT: 55 IN | SYSTOLIC BLOOD PRESSURE: 154 MMHG | BODY MASS INDEX: 31.94 KG/M2

## 2025-03-20 DIAGNOSIS — R60.0 LOWER EXTREMITY EDEMA: ICD-10-CM

## 2025-03-20 DIAGNOSIS — R06.02 SHORTNESS OF BREATH: ICD-10-CM

## 2025-03-20 DIAGNOSIS — I50.32 CHRONIC HEART FAILURE WITH PRESERVED EJECTION FRACTION: Primary | ICD-10-CM

## 2025-03-20 DIAGNOSIS — I63.411 CEREBROVASCULAR ACCIDENT (CVA) DUE TO EMBOLISM OF RIGHT MIDDLE CEREBRAL ARTERY (MULTI): ICD-10-CM

## 2025-03-20 DIAGNOSIS — I25.10 2-VESSEL CORONARY ARTERY DISEASE: ICD-10-CM

## 2025-03-20 DIAGNOSIS — I25.10 ATHEROSCLEROSIS OF NATIVE CORONARY ARTERY OF NATIVE HEART WITHOUT ANGINA PECTORIS: ICD-10-CM

## 2025-03-20 DIAGNOSIS — I10 PRIMARY HYPERTENSION: ICD-10-CM

## 2025-03-20 DIAGNOSIS — I10 ESSENTIAL HYPERTENSION: ICD-10-CM

## 2025-03-20 PROCEDURE — 3077F SYST BP >= 140 MM HG: CPT

## 2025-03-20 PROCEDURE — 99214 OFFICE O/P EST MOD 30 MIN: CPT

## 2025-03-20 PROCEDURE — 3079F DIAST BP 80-89 MM HG: CPT

## 2025-03-20 PROCEDURE — 1160F RVW MEDS BY RX/DR IN RCRD: CPT

## 2025-03-20 PROCEDURE — 71046 X-RAY EXAM CHEST 2 VIEWS: CPT

## 2025-03-20 PROCEDURE — 1157F ADVNC CARE PLAN IN RCRD: CPT

## 2025-03-20 PROCEDURE — 1159F MED LIST DOCD IN RCRD: CPT

## 2025-03-20 RX ORDER — PRAMIPEXOLE DIHYDROCHLORIDE 0.25 MG/1
0.25 TABLET ORAL
COMMUNITY
Start: 2025-01-19

## 2025-03-20 RX ORDER — VIT C/E/ZN/COPPR/LUTEIN/ZEAXAN 250MG-90MG
CAPSULE ORAL
COMMUNITY
Start: 2025-01-10

## 2025-03-20 RX ORDER — TORSEMIDE 20 MG/1
20 TABLET ORAL DAILY
Qty: 30 TABLET | Refills: 11 | Status: SHIPPED | OUTPATIENT
Start: 2025-03-20 | End: 2026-03-20

## 2025-03-20 RX ORDER — SIMETHICONE 80 MG
80 TABLET,CHEWABLE ORAL
COMMUNITY
Start: 2025-03-04

## 2025-03-20 NOTE — PROGRESS NOTES
"Chief Complaint:   CHF      History Of Present Illness:    Anne Alberts is a 89 y.o. female presenting today with complaint of increased SOB and BLE edema. She reports she has been having increased SOB with BLE swelling for a couple weeks and visiting NP at her nursing facility has increased her lasix to 40mg daily. Despite the increase of lasix she reports her symptoms continue to persist.  She reports she was also treated with antibiotics for possible respiratory infection. She denies any CP, palpitations, lightheadedness, syncope, orthopnea, PND.     Last Recorded Vitals:  Vitals:    03/20/25 1403   BP: 154/86   BP Location: Left arm   Patient Position: Sitting   BP Cuff Size: Adult   Pulse: 106   SpO2: (!) 82%   Weight: 62.6 kg (138 lb)   Height: 1.372 m (4' 6\")     Past Medical History:  She has a past medical history of Epistaxis (05/04/2016), Gastro-esophageal reflux disease without esophagitis (09/17/2020), Laceration without foreign body of left forearm, initial encounter (06/11/2015), Other conditions influencing health status, Other intervertebral disc degeneration, lumbar region (07/13/2015), Pelvic and perineal pain (10/12/2015), Personal history of diseases of the blood and blood-forming organs and certain disorders involving the immune mechanism, Personal history of other diseases of the circulatory system (09/15/2015), Personal history of other diseases of the digestive system (09/15/2015), Personal history of other endocrine, nutritional and metabolic disease (09/15/2015), Personal history of other specified conditions (05/03/2016), Personal history of transient ischemic attack (TIA), and cerebral infarction without residual deficits, Pure hypercholesterolemia, unspecified, and Thyrotoxicosis, unspecified without thyrotoxic crisis or storm (04/14/2015).    Past Surgical History:  She has a past surgical history that includes Other surgical history (05/24/2013); Knee arthroscopy w/ " debridement (05/24/2013); Other surgical history (05/24/2013); Cervical fusion (05/24/2013); Coronary artery bypass graft (05/24/2013); Appendectomy (05/24/2013); Back surgery (07/13/2015); Total shoulder arthroplasty (12/28/2016); Other surgical history (06/11/2021); Tonsillectomy (05/04/2016); Other surgical history (02/07/2014); Shoulder surgery (01/20/2014); Total knee arthroplasty (01/20/2014); and Colonoscopy (06/03/2014).      Social History:  She reports that she has never smoked. She has never used smokeless tobacco. No history on file for alcohol use and drug use.    Family History:  No family history on file.     Allergies:  Propoxyphene n-acetaminophen and Atorvastatin    Outpatient Medications:  Current Outpatient Medications   Medication Instructions    amLODIPine (NORVASC) 5 mg, Daily    aspirin-calcium carbonate 81 mg-300 mg calcium(777 mg) tablet 1 tablet, Daily    atorvastatin (LIPITOR) 40 mg, Daily    coenzyme Q-10 100 mg capsule 1 capsule, 2 times daily    gabapentin (NEURONTIN) 300 mg    meclizine (ANTIVERT) 12.5 mg, 3 times daily PRN    meloxicam (MOBIC) 7.5 mg, Daily    metoprolol succinate XL (TOPROL-XL) 50 mg, Daily    pantoprazole (PROTONIX) 40 mg, Daily before breakfast    pramipexole (MIRAPEX) 0.25 mg    PreserVision AREDS-2 250-90-40-1 mg tablet,chewable     simethicone (MYLICON) 80 mg    sucralfate (CARAFATE) 1 g    traMADol (ULTRAM) 50 mg     Physical Exam:  General: no acute distress  HEENT: EOMI, no scleral icterus.  Lungs: Crackles to auscultation bilaterally without wheezing, rales, or rhonchi.  Cardiovascular: Regular rhythm and rate. Normal S1 and S2. No murmurs, rubs, or gallops are appreciated. JVP normal.  Abdomen: Soft, nontender, nondistended. Bowel sounds present.  Extremities: Warm and well perfused with equal 2+ pulses bilaterally.  +2 edema.  Neurologic: Alert and oriented x3.      Last Labs:  CBC -  Lab Results   Component Value Date    WBC 7.1 08/20/2021    HGB 13.2  "08/20/2021    HCT 40.9 08/20/2021     08/20/2021     08/20/2021     CMP -  Lab Results   Component Value Date    CALCIUM 9.9 08/20/2021    PROT 7.3 08/20/2021    ALBUMIN 4.5 08/20/2021    AST 25 08/20/2021    ALT 16 08/20/2021    ALKPHOS 76 08/20/2021    BILITOT 0.6 08/20/2021     LIPID PANEL -   Lab Results   Component Value Date    CHOL 139 08/20/2021    TRIG 209 (H) 08/20/2021    HDL 39.8 (A) 08/20/2021    CHHDL 3.5 08/20/2021    LDLF 57 08/20/2021    VLDL 42 (H) 08/20/2021    NHDL 99 08/20/2021     RENAL FUNCTION PANEL -   Lab Results   Component Value Date    GLUCOSE 103 (H) 08/20/2021     (L) 08/20/2021    K 4.6 08/20/2021    CL 96 (L) 08/20/2021    CO2 27 08/20/2021    ANIONGAP 15 08/20/2021    BUN 18 08/20/2021    CREATININE 0.89 08/20/2021    CALCIUM 9.9 08/20/2021    ALBUMIN 4.5 08/20/2021      No results found for: \"BNP\", \"HGBA1C\"    Last Cardiology Tests:  ECG:  EKG 9/1/2021  NS[facility]    Echo:  Transthoracic echo (TTE) complete 11/20/2024   1. Left ventricular ejection fraction is normal, by visual estimate at 60-65%.   2. Spectral Doppler shows a Grade I (impaired relaxation pattern) of left ventricular diastolic filling with normal left atrial filling pressure.   3. There is normal right ventricular global systolic function.   4. Slightly elevated right ventricular systolic pressure.   5. Atrial septal aneurysm present.    TTE 4/06/2023  1. Left ventricular systolic function is normal with a 60% estimated ejection fraction.  2. Spectral Doppler shows an impaired relaxation pattern of left ventricular diastolic filling.  3. Mild to moderate mitral valve regurgitation.  4. Mildly elevated RVSP.  5. Mild aortic valve regurgitation.  6. Atrial septal aneurysm present.    Cath:  [facility] 8/27/2024  - No acute coronary findings.   - Moderate to severe eccentric calcified mid-LMCA stenosis (50%) with patent LIMA   to LAD and SVG to D1.     Stress Test:  Nuclear stress test 11/01/2021  1. Resting ECG " showed normal sinus rhythm with inverted T wave V2, V3.   2. No ECG changes from baseline.   3. No clinical or electrocardiographic evidence for ischemia at a maximal infusion.   4. Nuclear image results:  1. Abnormal pharmacologic stress myocardial perfusion scan is there  is increased uptake of the radionuclide in the right ventricular free  wall on the stress images-this suggest elevated right heart pressures.  2. Normal perfusion therefore no evidence for flow limiting coronary  disease at this time.  3. The computer-generated ejection fraction is normal at 69%.    Cardiac Imaging:  Carotid US 1/18/2017  Right Carotid: <50% stenosis of the right ICA. Right external carotid artery appears patent with no evidence of stenosis. No evidence of hemodynamically significant stenosis of the right common carotid artery. The right vertebral artery is patent with antegrade flow. No evidence of hemodynamically significant stenosis in the right subclavian.  Left Carotid: <50% stenosis of the left ICA. Left external carotid artery appears patent with no evidence of stenosis. No evidence of hemodynamically significant stenosis of the left common carotid artery. The left vertebral artery is patent with antegrade flow.  No evidence of hemodynamically significant stenosis in the left subclavian.    I have personally reviewed most recent PCP, cardiology, vascular, studies and/or documentation.      Assessment/Plan   CAD, s/p CABG surgery in 2007.  Recent hospitalization at [facility] with concerns of STEMI, however LHC showed patent bypass grafts and no PCI was required.  She denies any complaints of chest pain at today's visit whatsoever.  Continue statin therapy.  She is no longer on aspirin due to her advanced age, risk for falls, and anemia.    HFpEF, diastolic congestive heart failure most recent echo showing EF of 60-65% (11/20/24). She reports increased SOB and does have crackles present on exam today. Will switch her from lasix 20  to torsemide 20mg daily. Will obtain BNP, BMP, and chest xray. If she continues to have difficulties, we could consider the addition of an SGLT2 inhibitor, however she does have frequent UTI history.     HTN, elevated, /86 today.  She reports that this is not normal for her and her blood pressures typically run lower.  Would like her to have her blood pressure monitored at the nursing facility if continues to be elevated she will need to have medication adjustment.    Atrial septal aneurysm, this was noted on echo. She does have hx of stroke with no residual deficits.     Follow up with Dr. Ramicone in 2-3 months.     Allie Hines, APRN-CNP    Addendum  Chest x-ray results reviewed, she was noted to have extensive multifocal airspace disease concerning for pulmonary edema and superimposed pneumonia.  She was started on levofloxacin at the nursing facility for this per her daughter who I spoke with over the phone.  Her labs are pending to be done.  Continue torsemide 20 mg daily.  Continue to monitor weight and fluid restriction.

## 2025-04-03 ENCOUNTER — APPOINTMENT (OUTPATIENT)
Dept: RADIOLOGY | Facility: CLINIC | Age: OVER 89
End: 2025-04-03
Payer: COMMERCIAL

## 2025-04-17 ENCOUNTER — APPOINTMENT (OUTPATIENT)
Dept: CARDIOLOGY | Facility: CLINIC | Age: OVER 89
End: 2025-04-17
Payer: COMMERCIAL

## 2025-06-05 PROBLEM — E66.811 CLASS 1 OBESITY WITH BODY MASS INDEX (BMI) OF 33.0 TO 33.9 IN ADULT: Status: ACTIVE | Noted: 2025-06-05

## 2025-06-30 ENCOUNTER — APPOINTMENT (OUTPATIENT)
Dept: CARDIOLOGY | Facility: CLINIC | Age: OVER 89
End: 2025-06-30
Payer: COMMERCIAL